# Patient Record
Sex: FEMALE | Race: ASIAN | NOT HISPANIC OR LATINO | URBAN - METROPOLITAN AREA
[De-identification: names, ages, dates, MRNs, and addresses within clinical notes are randomized per-mention and may not be internally consistent; named-entity substitution may affect disease eponyms.]

---

## 2019-01-02 ENCOUNTER — EMERGENCY (EMERGENCY)
Facility: HOSPITAL | Age: 39
LOS: 1 days | Discharge: HOME | End: 2019-01-02
Attending: EMERGENCY MEDICINE | Admitting: EMERGENCY MEDICINE
Payer: MEDICAID

## 2019-01-02 VITALS
OXYGEN SATURATION: 100 % | TEMPERATURE: 98 F | DIASTOLIC BLOOD PRESSURE: 76 MMHG | RESPIRATION RATE: 16 BRPM | SYSTOLIC BLOOD PRESSURE: 127 MMHG | HEART RATE: 83 BPM

## 2019-01-02 PROCEDURE — 99283 EMERGENCY DEPT VISIT LOW MDM: CPT

## 2019-01-02 RX ORDER — IBUPROFEN 200 MG
600 TABLET ORAL ONCE
Qty: 0 | Refills: 0 | Status: COMPLETED | OUTPATIENT
Start: 2019-01-02 | End: 2019-01-02

## 2019-01-02 RX ORDER — AMOXICILLIN 250 MG/5ML
1 SUSPENSION, RECONSTITUTED, ORAL (ML) ORAL
Qty: 20 | Refills: 0 | OUTPATIENT
Start: 2019-01-02 | End: 2019-01-11

## 2019-01-02 RX ORDER — IBUPROFEN 200 MG
1 TABLET ORAL
Qty: 15 | Refills: 0 | OUTPATIENT
Start: 2019-01-02

## 2019-01-02 RX ADMIN — Medication 600 MILLIGRAM(S): at 17:27

## 2019-01-02 NOTE — ED PROVIDER NOTE - MEDICAL DECISION MAKING DETAILS
37 y/o F pt with no significant PMHX presents to the ED complaining of bilateral ear pain for three days. Plan: check cbc to r/o anemia as cause. Unclear if infected, but given complicated ear history, treat with antibiotics.

## 2019-01-02 NOTE — ED PROVIDER NOTE - PROGRESS NOTE DETAILS
ELLIOTT Delaney: pt seen by phill Solorzano and tx for AOM. Also offered CBC to check on her chronic anemia however patient is asymptomatic. pt declined CBC in results waiting and wants to go home. Advised pt fo fu with her PCP this week for CBC to check H/H. Pt agrees with plan. Will continue to d.c home with PO meds for AOM.

## 2019-01-02 NOTE — ED PROVIDER NOTE - NSFOLLOWUPINSTRUCTIONS_ED_ALL_ED_FT
Take Amoxicillin as prescribed  Follow up with your PMD for a CBC this week  Return to ER for any new or worsening symptoms

## 2019-01-02 NOTE — ED ADULT TRIAGE NOTE - CHIEF COMPLAINT QUOTE
pt c/o bilateral ear pain x3 weeks with drainage out of left ear- denies any fever/chills, visual changes, headache- appears comfortable

## 2019-01-02 NOTE — ED PROVIDER NOTE - OBJECTIVE STATEMENT
37 y/o F pt with no significant PMHX presents to the ED complaining of bilateral ear pain for three days. Pt states that water comes out of the left ear, and that pain in both ears has been on and off for a while. Pt states that she went to the doctor and was told that she needs to get an operation done. Pt denies n/v/d, fever, chills, or any other medical problems.
